# Patient Record
Sex: FEMALE | Race: WHITE | NOT HISPANIC OR LATINO | Employment: PART TIME | URBAN - NONMETROPOLITAN AREA
[De-identification: names, ages, dates, MRNs, and addresses within clinical notes are randomized per-mention and may not be internally consistent; named-entity substitution may affect disease eponyms.]

---

## 2020-11-16 ENCOUNTER — OFFICE VISIT (OUTPATIENT)
Dept: CARDIOLOGY | Facility: CLINIC | Age: 65
End: 2020-11-16

## 2020-11-16 VITALS
OXYGEN SATURATION: 98 % | HEART RATE: 78 BPM | HEIGHT: 62 IN | DIASTOLIC BLOOD PRESSURE: 78 MMHG | SYSTOLIC BLOOD PRESSURE: 160 MMHG | BODY MASS INDEX: 35.99 KG/M2 | WEIGHT: 195.6 LBS | TEMPERATURE: 97.5 F

## 2020-11-16 DIAGNOSIS — R06.83 SNORING: ICD-10-CM

## 2020-11-16 DIAGNOSIS — R06.02 SHORTNESS OF BREATH: ICD-10-CM

## 2020-11-16 DIAGNOSIS — R00.2 PALPITATIONS: ICD-10-CM

## 2020-11-16 DIAGNOSIS — I10 ESSENTIAL HYPERTENSION: Primary | ICD-10-CM

## 2020-11-16 PROCEDURE — 99204 OFFICE O/P NEW MOD 45 MIN: CPT | Performed by: PHYSICIAN ASSISTANT

## 2020-11-16 RX ORDER — ESTRADIOL 0.1 MG/G
2 CREAM VAGINAL DAILY
COMMUNITY

## 2020-11-16 RX ORDER — TIMOLOL MALEATE 5 MG/ML
SOLUTION OPHTHALMIC
COMMUNITY
Start: 2020-08-25

## 2020-11-16 RX ORDER — OMEPRAZOLE 20 MG/1
20 CAPSULE, DELAYED RELEASE ORAL DAILY
COMMUNITY

## 2020-11-16 RX ORDER — CHLORAL HYDRATE 500 MG
CAPSULE ORAL
COMMUNITY
End: 2022-08-01

## 2020-11-16 RX ORDER — HYDROCHLOROTHIAZIDE 12.5 MG/1
12.5 CAPSULE, GELATIN COATED ORAL DAILY
COMMUNITY
Start: 2020-09-15

## 2020-11-16 RX ORDER — CLONIDINE HYDROCHLORIDE 0.1 MG/1
TABLET ORAL
COMMUNITY
Start: 2020-11-04

## 2020-11-16 RX ORDER — MELOXICAM 15 MG/1
TABLET ORAL DAILY
COMMUNITY
Start: 2020-10-09

## 2020-11-16 RX ORDER — BRIMONIDINE TARTRATE 0.1 %
DROPS OPHTHALMIC (EYE)
COMMUNITY
Start: 2020-08-25 | End: 2022-08-01

## 2020-11-16 RX ORDER — BENAZEPRIL HYDROCHLORIDE 20 MG/1
TABLET ORAL DAILY
COMMUNITY
Start: 2020-09-15

## 2020-11-16 RX ORDER — VITAMIN E 268 MG
400 CAPSULE ORAL DAILY
COMMUNITY
End: 2022-08-01

## 2020-11-16 RX ORDER — DIPHENHYDRAMINE HCL 25 MG
25 CAPSULE ORAL NIGHTLY
COMMUNITY
End: 2022-08-01

## 2020-11-16 RX ORDER — AMLODIPINE BESYLATE 5 MG/1
TABLET ORAL DAILY
COMMUNITY
Start: 2020-11-04

## 2020-11-16 RX ORDER — CETIRIZINE HYDROCHLORIDE 10 MG/1
10 TABLET ORAL DAILY
COMMUNITY

## 2020-11-16 RX ORDER — VENLAFAXINE 75 MG/1
TABLET ORAL
COMMUNITY
Start: 2020-10-26

## 2020-11-16 RX ORDER — ASPIRIN 81 MG/1
TABLET ORAL DAILY
COMMUNITY
Start: 2020-11-04

## 2020-11-16 NOTE — PROGRESS NOTES
Subjective   Vanna Burton is a 65 y.o. female     Chief Complaint   Patient presents with   • Establish Care     presents for cardiac eval   • Hypertension   • Palpitations   • Shortness of Breath       HPI  The patient presents today for initial evaluation.  This pleasant patient is referred because of hypertension, and mild symptoms otherwise.  The patient really has never had any severe significant problems with blood pressure.  Recently, when checking blood pressures, she noted blood pressures even as high as 200/100.  It seems, by her report, that this was a rather sudden onset of hypertension.  She went to see her primary care provider.  Amlodipine was instituted in addition to her benazepril dosing.  She was given clonidine to utilize as needed for hypertensive excursions.  It was felt best to have the patient referred on for cardiac evaluation and she presents today in that setting.    Symptomatically, the patient is doing fairly well.  She has dyspnea at times.  She has only rare episodes of palpitations, certainly nothing to suggest sustained dysrhythmic activity.  The patient reports no dizziness or syncope.  Exercise capacity is adequate and at baseline.  She really has no limitation from cardiovascular standpoint at this time.  The patient has no failure symptoms.  She denies chest pain or anginal equivalent complications otherwise.  She has no further complaints.      Current Outpatient Medications   Medication Sig Dispense Refill   • Advair Diskus 250-50 MCG/DOSE DISKUS INHALE 1  PUFF PO BID FOR ASTHMA     • Alphagan P 0.1 % solution ophthalmic solution      • amLODIPine (NORVASC) 5 MG tablet Take  by mouth Daily.     • Aspirin Adult Low Strength 81 MG EC tablet Take  by mouth Daily.     • benazepril (LOTENSIN) 20 MG tablet Take  by mouth Daily.     • cetirizine (zyrTEC) 10 MG tablet Take 10 mg by mouth Daily.     • cloNIDine (CATAPRES) 0.1 MG tablet TK 1 T PO UP TO 3 TIMES DAILY IF BLOOD PRESSURE IS  GREATER THAN  160/95     • diphenhydrAMINE (BENADRYL) 25 mg capsule Take 25 mg by mouth Every Night.     • estradiol (ESTRACE) 0.1 MG/GM vaginal cream Insert 2 g into the vagina Daily.     • hydroCHLOROthiazide (MICROZIDE) 12.5 MG capsule Take 12.5 mg by mouth Daily.     • meloxicam (MOBIC) 15 MG tablet Take  by mouth Daily.     • Omega-3 Fatty Acids (fish oil) 1000 MG capsule capsule Take  by mouth Daily With Breakfast.     • omeprazole (priLOSEC) 20 MG capsule Take 20 mg by mouth Daily.     • timolol (TIMOPTIC-XR) 0.5 % ophthalmic gel-forming      • venlafaxine (EFFEXOR) 75 MG tablet      • vitamin E 400 UNIT capsule Take 400 Units by mouth Daily.       No current facility-administered medications for this visit.        Sulfa antibiotics    Past Medical History:   Diagnosis Date   • Asthma    • Hypertension        Social History     Socioeconomic History   • Marital status:      Spouse name: Not on file   • Number of children: Not on file   • Years of education: Not on file   • Highest education level: Not on file   Tobacco Use   • Smoking status: Former Smoker     Years: 20.00     Quit date:      Years since quittin.8   • Smokeless tobacco: Never Used   Substance and Sexual Activity   • Alcohol use: Never     Frequency: Never   • Drug use: Never       Family History   Problem Relation Age of Onset   • Stroke Mother    • Hypertension Mother    • Hypertension Father        Review of Systems   Constitutional: Positive for fatigue. Negative for chills, diaphoresis and fever.   Eyes: Positive for visual disturbance.   Respiratory: Positive for shortness of breath (on exertion). Negative for apnea, cough, chest tightness and wheezing.    Cardiovascular: Positive for palpitations (occas flutter and racing). Negative for chest pain and leg swelling.   Gastrointestinal: Negative.  Negative for abdominal pain, blood in stool, constipation, diarrhea, nausea and vomiting.   Endocrine: Negative.   "  Genitourinary: Negative.  Negative for hematuria.   Musculoskeletal: Positive for arthralgias. Negative for back pain, myalgias and neck pain.   Skin: Negative.    Allergic/Immunologic: Positive for environmental allergies. Negative for food allergies.   Neurological: Positive for headaches. Negative for dizziness, syncope, weakness, light-headedness and numbness.   Hematological: Bruises/bleeds easily.   Psychiatric/Behavioral: Negative.  Negative for agitation and sleep disturbance. The patient is not nervous/anxious.        Objective     Vitals:    11/16/20 1047   BP: 160/78   BP Location: Left arm   Patient Position: Sitting   Pulse: 78   Temp: 97.5 °F (36.4 °C)   SpO2: 98%   Weight: 88.7 kg (195 lb 9.6 oz)   Height: 157.5 cm (62\")        /78 (BP Location: Left arm, Patient Position: Sitting)   Pulse 78   Temp 97.5 °F (36.4 °C)   Ht 157.5 cm (62\")   Wt 88.7 kg (195 lb 9.6 oz)   SpO2 98%   BMI 35.78 kg/m²      Lab Results (most recent)     None          Physical Exam  Vitals signs and nursing note reviewed.   Constitutional:       General: She is not in acute distress.     Appearance: She is well-developed.   HENT:      Head: Normocephalic and atraumatic.   Eyes:      Conjunctiva/sclera: Conjunctivae normal.      Pupils: Pupils are equal, round, and reactive to light.   Neck:      Musculoskeletal: Normal range of motion and neck supple.      Vascular: No JVD.      Trachea: No tracheal deviation.   Cardiovascular:      Rate and Rhythm: Normal rate and regular rhythm.      Heart sounds: Normal heart sounds.   Pulmonary:      Effort: Pulmonary effort is normal.      Breath sounds: Normal breath sounds.   Abdominal:      General: Bowel sounds are normal. There is no distension.      Palpations: Abdomen is soft. There is no mass.      Tenderness: There is no abdominal tenderness. There is no guarding or rebound.   Musculoskeletal: Normal range of motion.         General: No tenderness or deformity. "   Skin:     General: Skin is warm and dry.      Coloration: Skin is not pale.      Findings: No erythema or rash.   Neurological:      Mental Status: She is alert and oriented to person, place, and time.   Psychiatric:         Behavior: Behavior normal.         Thought Content: Thought content normal.         Judgment: Judgment normal.         Procedure   Procedures         Assessment/Plan      Diagnosis Plan   1. Essential hypertension  Adult Transthoracic Echo Complete W/ Cont if Necessary Per Protocol    Overnight Sleep Oximetry Study   2. Shortness of breath  Adult Transthoracic Echo Complete W/ Cont if Necessary Per Protocol    Overnight Sleep Oximetry Study   3. Palpitations  Adult Transthoracic Echo Complete W/ Cont if Necessary Per Protocol    Overnight Sleep Oximetry Study   4. Snoring  Adult Transthoracic Echo Complete W/ Cont if Necessary Per Protocol    Overnight Sleep Oximetry Study   1.  The patient is referred in the setting of recurrent hypertensive excursions.  Amlodipine has been started.  She has continued benazepril.  She has clonidine to utilize as needed for hypertensive excursions noted, blood pressure 160/90 or greater.    2.  She has improved but remains hypertensive on amlodipine at 5 mg 1 tab daily.  I have asked her to double up on that to 10 mg total daily.  If that is effective, she will call and I would call her just to 10 mg 1 tab daily dosing to her pharmacy.    3.  The patient will continue antihypertensive therapies otherwise all as outlined above.    4.  I would like to schedule for an echocardiogram so that we can evaluate LV size and function, valvular morphologies, and cardiac structure otherwise.    5.  Also, with significant hypertension, history of snoring, and clinical course/clinical symptoms otherwise, I will schedule for an overnight oximetry.  This will allow us to screen for undiagnosed sleep disorder.    6.  If she remains hypertensive despite increased  antihypertensive therapies, I feel that we will be forced to evaluate for causes of secondary hypertension.    7.  We have discussed lifestyle modifications including sodium restriction, diet, exercise, etc.    8.  I will see her back to review results of all the above in response to increase in medical therapy.  If, within the next week or 2, blood pressures do not improve with intensified amlodipine therapy, she will call and we will adjust further at that time.             Vanna Josephine  reports that she quit smoking about 10 months ago. She quit after 20.00 years of use. She has never used smokeless tobacco..         Patient's Body mass index is 35.78 kg/m². BMI is above normal parameters. Recommendations include: educational material.           Electronically signed by:

## 2020-11-16 NOTE — PATIENT INSTRUCTIONS

## 2020-11-25 ENCOUNTER — TELEPHONE (OUTPATIENT)
Dept: CARDIOLOGY | Facility: CLINIC | Age: 65
End: 2020-11-25

## 2020-11-25 DIAGNOSIS — R06.83 SNORING: ICD-10-CM

## 2020-11-25 DIAGNOSIS — R79.81 ABNORMAL PULSE OXIMETRY: Primary | ICD-10-CM

## 2020-11-25 DIAGNOSIS — R06.02 SHORTNESS OF BREATH: ICD-10-CM

## 2020-11-25 NOTE — TELEPHONE ENCOUNTER
Left message for patient to return call for test results. [facility] SHEETS    ----- Message from JOSE CARLOS Vargas sent at 11/25/2020  4:24 PM EST -----    Overnight oximetry abnormal.  Please refer to pulmonology.  Please let patient know today if possible.

## 2020-11-30 NOTE — TELEPHONE ENCOUNTER
Patient aware of results. She has no preference but request to stay in [place] if possible. Advised she will receive a call once scheduled.  Gretchen Colin MA      Left message for patient to return call for test results. MS,BLANCA     ----- Message from JOSE CARLOS Vargas sent at 11/25/2020  4:24 PM EST -----     Overnight oximetry abnormal.  Please refer to pulmonology.  Please let patient know today if possible

## 2020-12-16 ENCOUNTER — HOSPITAL ENCOUNTER (OUTPATIENT)
Dept: CARDIOLOGY | Facility: HOSPITAL | Age: 65
Discharge: HOME | End: 2020-12-16
Admitting: PHYSICIAN ASSISTANT

## 2020-12-16 VITALS — BODY MASS INDEX: 35.99 KG/M2 | WEIGHT: 195.55 LBS | HEIGHT: 62 IN

## 2020-12-16 DIAGNOSIS — R00.2 PALPITATIONS: ICD-10-CM

## 2020-12-16 DIAGNOSIS — R06.02 SHORTNESS OF BREATH: ICD-10-CM

## 2020-12-16 DIAGNOSIS — R06.83 SNORING: ICD-10-CM

## 2020-12-16 DIAGNOSIS — I10 ESSENTIAL HYPERTENSION: ICD-10-CM

## 2020-12-16 PROCEDURE — 93306 TTE W/DOPPLER COMPLETE: CPT

## 2020-12-16 PROCEDURE — 93356 MYOCRD STRAIN IMG SPCKL TRCK: CPT

## 2020-12-16 PROCEDURE — 93306 TTE W/DOPPLER COMPLETE: CPT | Performed by: INTERNAL MEDICINE

## 2020-12-16 PROCEDURE — 93356 MYOCRD STRAIN IMG SPCKL TRCK: CPT | Performed by: INTERNAL MEDICINE

## 2020-12-20 LAB
AORTIC DIMENSIONLESS INDEX: 0.9 (DI)
BH CV ECHO MEAS - ACS: 1.6 CM
BH CV ECHO MEAS - AO MAX PG (FULL): 0.99 MMHG
BH CV ECHO MEAS - AO MAX PG: 5.6 MMHG
BH CV ECHO MEAS - AO MEAN PG (FULL): 1 MMHG
BH CV ECHO MEAS - AO MEAN PG: 3 MMHG
BH CV ECHO MEAS - AO ROOT AREA (BSA CORRECTED): 1.5
BH CV ECHO MEAS - AO ROOT AREA: 6.6 CM^2
BH CV ECHO MEAS - AO ROOT DIAM: 2.9 CM
BH CV ECHO MEAS - AO V2 MAX: 118 CM/SEC
BH CV ECHO MEAS - AO V2 MEAN: 81.3 CM/SEC
BH CV ECHO MEAS - AO V2 VTI: 29.8 CM
BH CV ECHO MEAS - BSA(HAYCOCK): 2 M^2
BH CV ECHO MEAS - BSA: 1.9 M^2
BH CV ECHO MEAS - BZI_BMI: 35.7 KILOGRAMS/M^2
BH CV ECHO MEAS - BZI_METRIC_HEIGHT: 157.5 CM
BH CV ECHO MEAS - BZI_METRIC_WEIGHT: 88.5 KG
BH CV ECHO MEAS - EDV(CUBED): 74.1 ML
BH CV ECHO MEAS - EDV(TEICH): 78.6 ML
BH CV ECHO MEAS - EF(CUBED): 63.9 %
BH CV ECHO MEAS - EF(TEICH): 55.8 %
BH CV ECHO MEAS - EF_3D-VOL: 63 %
BH CV ECHO MEAS - ESV(CUBED): 26.7 ML
BH CV ECHO MEAS - ESV(TEICH): 34.7 ML
BH CV ECHO MEAS - FS: 28.8 %
BH CV ECHO MEAS - IVS/LVPW: 1.3
BH CV ECHO MEAS - IVSD: 1.3 CM
BH CV ECHO MEAS - LA DIMENSION: 3.7 CM
BH CV ECHO MEAS - LA/AO: 1.3
BH CV ECHO MEAS - LAT PEAK E' VEL: 9.2 CM/SEC
BH CV ECHO MEAS - LV IVRT: 0.11 SEC
BH CV ECHO MEAS - LV MASS(C)D: 161 GRAMS
BH CV ECHO MEAS - LV MASS(C)DI: 85.1 GRAMS/M^2
BH CV ECHO MEAS - LV MAX PG: 4.6 MMHG
BH CV ECHO MEAS - LV MEAN PG: 2 MMHG
BH CV ECHO MEAS - LV V1 MAX: 107 CM/SEC
BH CV ECHO MEAS - LV V1 MEAN: 54.7 CM/SEC
BH CV ECHO MEAS - LV V1 VTI: 22.7 CM
BH CV ECHO MEAS - LVIDD: 4.2 CM
BH CV ECHO MEAS - LVIDS: 3 CM
BH CV ECHO MEAS - LVPWD: 0.98 CM
BH CV ECHO MEAS - MED PEAK E' VEL: 7.1 CM/SEC
BH CV ECHO MEAS - MV A MAX VEL: 63.1 CM/SEC
BH CV ECHO MEAS - MV DEC SLOPE: 516 CM/SEC^2
BH CV ECHO MEAS - MV DEC TIME: 0.19 SEC
BH CV ECHO MEAS - MV E MAX VEL: 89.3 CM/SEC
BH CV ECHO MEAS - MV E/A: 1.4
BH CV ECHO MEAS - MV MAX PG: 4 MMHG
BH CV ECHO MEAS - MV MEAN PG: 2 MMHG
BH CV ECHO MEAS - MV P1/2T MAX VEL: 98.5 CM/SEC
BH CV ECHO MEAS - MV P1/2T: 55.9 MSEC
BH CV ECHO MEAS - MV V2 MAX: 100 CM/SEC
BH CV ECHO MEAS - MV V2 MEAN: 60 CM/SEC
BH CV ECHO MEAS - MV V2 VTI: 26.2 CM
BH CV ECHO MEAS - MVA P1/2T LCG: 2.2 CM^2
BH CV ECHO MEAS - MVA(P1/2T): 3.9 CM^2
BH CV ECHO MEAS - PA MAX PG (FULL): 1.6 MMHG
BH CV ECHO MEAS - PA MAX PG: 4 MMHG
BH CV ECHO MEAS - PA MEAN PG (FULL): 1 MMHG
BH CV ECHO MEAS - PA MEAN PG: 2 MMHG
BH CV ECHO MEAS - PA V2 MAX: 100 CM/SEC
BH CV ECHO MEAS - PA V2 MEAN: 65.7 CM/SEC
BH CV ECHO MEAS - PA V2 VTI: 28.9 CM
BH CV ECHO MEAS - RV MAX PG: 2.4 MMHG
BH CV ECHO MEAS - RV MEAN PG: 1 MMHG
BH CV ECHO MEAS - RV V1 MAX: 77 CM/SEC
BH CV ECHO MEAS - RV V1 MEAN: 50.4 CM/SEC
BH CV ECHO MEAS - RV V1 VTI: 19.8 CM
BH CV ECHO MEAS - RVDD: 2.6 CM
BH CV ECHO MEAS - SI(AO): 104.1 ML/M^2
BH CV ECHO MEAS - SI(CUBED): 25 ML/M^2
BH CV ECHO MEAS - SI(TEICH): 23.2 ML/M^2
BH CV ECHO MEAS - SV(AO): 196.8 ML
BH CV ECHO MEAS - SV(CUBED): 47.4 ML
BH CV ECHO MEAS - SV(TEICH): 43.9 ML
BH CV ECHO MEASUREMENTS AVERAGE E/E' RATIO: 10.96
MAXIMAL PREDICTED HEART RATE: 156 BPM
STRESS TARGET HR: 133 BPM

## 2020-12-21 ENCOUNTER — TELEPHONE (OUTPATIENT)
Dept: CARDIOLOGY | Facility: CLINIC | Age: 65
End: 2020-12-21

## 2020-12-21 NOTE — TELEPHONE ENCOUNTER
Result Text     1.  LV size, function, and wall motion are normal.  Visually estimated ejection fraction of 60 to 65% is corroborated by a 3D EF of 63%.  Normal global longitudinal strain of -17.3% supports normal LV mechanical functioning.  There is grade 1A diastolic dysfunction with borderline left atrial enlargement.  Right heart chambers are normal.     2.  The aortic valve is normally configured with no aortic stenosis nor aortic insufficiency.  The mitral valve is minimally sclerotic with no mitral stenosis and trivial MR.  Right-sided heart valves are grossly normal.     3.  No pericardial or great vessel pathology.     4.  Pulmonary artery pressures cannot be calculated.          ----- Message from Gretchen Colin MA sent at 12/21/2020  9:23 AM EST -----    ----- Message -----  From: Levi Alberto PA  Sent: 12/21/2020   8:45 AM EST  To: Gretchen Colin MA    Routine follow-up.

## 2020-12-22 NOTE — TELEPHONE ENCOUNTER
Rachel Delgadilloe Card [facility]   Caller: Unspecified (Today,  2:21 PM)              PT LVM THAT SHE MISSED A CALL FROM OUR OFFICE.      Informed pt of results, she verbalized understanding.

## 2021-07-29 ENCOUNTER — OFFICE VISIT (OUTPATIENT)
Dept: CARDIOLOGY | Facility: CLINIC | Age: 66
End: 2021-07-29

## 2021-07-29 VITALS
HEIGHT: 62 IN | WEIGHT: 198 LBS | SYSTOLIC BLOOD PRESSURE: 126 MMHG | DIASTOLIC BLOOD PRESSURE: 79 MMHG | HEART RATE: 83 BPM | BODY MASS INDEX: 36.44 KG/M2 | OXYGEN SATURATION: 97 %

## 2021-07-29 DIAGNOSIS — R00.2 PALPITATIONS: ICD-10-CM

## 2021-07-29 DIAGNOSIS — R06.02 SHORTNESS OF BREATH: ICD-10-CM

## 2021-07-29 DIAGNOSIS — I10 ESSENTIAL HYPERTENSION: Primary | ICD-10-CM

## 2021-07-29 PROCEDURE — 99213 OFFICE O/P EST LOW 20 MIN: CPT | Performed by: PHYSICIAN ASSISTANT

## 2021-07-29 PROCEDURE — 93000 ELECTROCARDIOGRAM COMPLETE: CPT | Performed by: PHYSICIAN ASSISTANT

## 2021-07-29 NOTE — PROGRESS NOTES
Problem list     Subjective   Vanna Burton is a 66 y.o. female     Chief Complaint   Patient presents with   • Follow-up     testing        HPI  The patient presents back today to review clinical course, response to blood pressure medication, and for discussion of previous test results.  We had seen her because of hypertension at last evaluation.  She was subsequent scheduled for an echocardiogram and an overnight oximeter.  Echo supported preserved systolic function with no significant valvular issues or structural abnormalities noted otherwise.  Overnight oximeter suggested undiagnosed sleep disorder.  She was recommended to have pulmonology/sleep medicine evaluation.  She has not had that and would prefer to hold on that.  She will call should she decide to have that in the future.  Clinically, the patient reports that her blood pressures have now normalized on current medical regimen.  Blood pressures at home mirror that as recorded below.  She denies chest pain.  She has stable dyspnea.  She has no failure or dysrhythmic symptoms.  The patient has no further complaints.    Current Outpatient Medications on File Prior to Visit   Medication Sig Dispense Refill   • Advair Diskus 250-50 MCG/DOSE DISKUS INHALE 1  PUFF PO BID FOR ASTHMA     • Alphagan P 0.1 % solution ophthalmic solution      • amLODIPine (NORVASC) 5 MG tablet Take  by mouth Daily.     • Aspirin Adult Low Strength 81 MG EC tablet Take  by mouth Daily.     • benazepril (LOTENSIN) 20 MG tablet Take  by mouth Daily.     • cetirizine (zyrTEC) 10 MG tablet Take 10 mg by mouth Daily.     • cloNIDine (CATAPRES) 0.1 MG tablet TK 1 T PO UP TO 3 TIMES DAILY IF BLOOD PRESSURE IS GREATER THAN  160/95     • diphenhydrAMINE (BENADRYL) 25 mg capsule Take 25 mg by mouth Every Night.     • estradiol (ESTRACE) 0.1 MG/GM vaginal cream Insert 2 g into the vagina Daily.     • hydroCHLOROthiazide (MICROZIDE) 12.5 MG capsule Take 12.5 mg by mouth Daily.     • meloxicam  (MOBIC) 15 MG tablet Take  by mouth Daily.     • timolol (TIMOPTIC-XR) 0.5 % ophthalmic gel-forming      • venlafaxine (EFFEXOR) 75 MG tablet      • Omega-3 Fatty Acids (fish oil) 1000 MG capsule capsule Take  by mouth Daily With Breakfast.     • omeprazole (priLOSEC) 20 MG capsule Take 20 mg by mouth Daily.     • vitamin E 400 UNIT capsule Take 400 Units by mouth Daily.       No current facility-administered medications on file prior to visit.       Sulfa antibiotics    Past Medical History:   Diagnosis Date   • Asthma    • Hypertension        Social History     Socioeconomic History   • Marital status:      Spouse name: Not on file   • Number of children: Not on file   • Years of education: Not on file   • Highest education level: Not on file   Tobacco Use   • Smoking status: Former Smoker     Years: .     Quit date:      Years since quittin.5   • Smokeless tobacco: Never Used   Substance and Sexual Activity   • Alcohol use: Never   • Drug use: Never       Family History   Problem Relation Age of Onset   • Stroke Mother    • Hypertension Mother    • Hypertension Father        Review of Systems   Constitutional: Positive for fatigue. Negative for chills and fever.   HENT: Negative.  Negative for congestion, rhinorrhea and sore throat.    Eyes: Negative.  Negative for visual disturbance.   Respiratory: Negative.  Negative for chest tightness, shortness of breath and wheezing.    Cardiovascular: Negative.  Negative for chest pain, palpitations and leg swelling.   Gastrointestinal: Negative.    Endocrine: Negative.    Genitourinary: Negative.    Musculoskeletal: Positive for arthralgias and back pain. Negative for neck pain.   Skin: Negative.  Negative for rash and wound.   Allergic/Immunologic: Positive for environmental allergies.   Neurological: Negative for dizziness, weakness, numbness and headaches.   Hematological: Bruises/bleeds easily (bruises/bleeds).   Psychiatric/Behavioral: Negative.   "Negative for sleep disturbance.       Objective   Vitals:    07/29/21 1625   BP: 126/79   BP Location: Left arm   Patient Position: Sitting   Pulse: 83   SpO2: 97%   Weight: 89.8 kg (198 lb)   Height: 157.5 cm (62\")      /79 (BP Location: Left arm, Patient Position: Sitting)   Pulse 83   Ht 157.5 cm (62\")   Wt 89.8 kg (198 lb)   SpO2 97%   BMI 36.21 kg/m²    Lab Results (most recent)     None        Physical Exam  Vitals and nursing note reviewed.   Constitutional:       General: She is not in acute distress.     Appearance: She is well-developed.   HENT:      Head: Normocephalic and atraumatic.   Eyes:      Conjunctiva/sclera: Conjunctivae normal.      Pupils: Pupils are equal, round, and reactive to light.   Neck:      Vascular: No JVD.      Trachea: No tracheal deviation.   Cardiovascular:      Rate and Rhythm: Normal rate and regular rhythm.      Heart sounds: Normal heart sounds.   Pulmonary:      Effort: Pulmonary effort is normal.      Breath sounds: Normal breath sounds.   Abdominal:      General: Bowel sounds are normal. There is no distension.      Palpations: Abdomen is soft. There is no mass.      Tenderness: There is no abdominal tenderness. There is no guarding or rebound.   Musculoskeletal:         General: No tenderness or deformity. Normal range of motion.      Cervical back: Normal range of motion and neck supple.   Skin:     General: Skin is warm and dry.      Coloration: Skin is not pale.      Findings: No erythema or rash.   Neurological:      Mental Status: She is alert and oriented to person, place, and time.   Psychiatric:         Behavior: Behavior normal.         Thought Content: Thought content normal.         Judgment: Judgment normal.           Procedure     ECG 12 Lead    Date/Time: 7/29/2021 4:28 PM  Performed by: Levi Alberto PA  Authorized by: Levi Alberto PA   Comparison: compared with previous ECG from 11/4/2020  Comparison to previous ECG: Sinus rhythm, rate " 72, normal axis, no acute changes noted.                 Assessment/Plan      Diagnosis Plan   1. Essential hypertension     2. Shortness of breath     3. Palpitations       1.  At this time, the patient appears to be doing fairly well from general cardiovascular standpoint.  Her dyspnea is stable.  Her palpitations have largely resolved.  She denies angina, failure, or dysrhythmic symptoms otherwise.    2.  On current medical regimen, she reports that her blood pressures have now normalized.  We will continue antihypertensive regimen without change.  She will monitor blood pressures and call to us for any issues.    3.  Her echocardiogram findings indicate preserved systolic function with no significant abnormalities.  Overnight oximeter findings were abnormal suggesting undiagnosed sleep disorder.  She does not want to pursue evaluation for sleep apnea/sleep study at this time.  Should she change her mind on that, she will call.    4.  We will make no adjustments in medications.  As she is doing well, nothing further and we will see her on a yearly evaluation.              Electronically signed by:

## 2021-07-29 NOTE — PATIENT INSTRUCTIONS

## 2022-08-01 ENCOUNTER — OFFICE VISIT (OUTPATIENT)
Dept: CARDIOLOGY | Facility: CLINIC | Age: 67
End: 2022-08-01

## 2022-08-01 VITALS
HEIGHT: 62 IN | HEART RATE: 82 BPM | WEIGHT: 198 LBS | DIASTOLIC BLOOD PRESSURE: 86 MMHG | BODY MASS INDEX: 36.44 KG/M2 | OXYGEN SATURATION: 96 % | SYSTOLIC BLOOD PRESSURE: 151 MMHG

## 2022-08-01 DIAGNOSIS — R00.2 PALPITATIONS: ICD-10-CM

## 2022-08-01 DIAGNOSIS — R01.1 MURMUR: ICD-10-CM

## 2022-08-01 DIAGNOSIS — R06.02 SHORTNESS OF BREATH: Primary | ICD-10-CM

## 2022-08-01 PROCEDURE — 99213 OFFICE O/P EST LOW 20 MIN: CPT | Performed by: PHYSICIAN ASSISTANT

## 2022-08-01 PROCEDURE — 93000 ELECTROCARDIOGRAM COMPLETE: CPT | Performed by: PHYSICIAN ASSISTANT

## 2022-08-01 RX ORDER — ALBUTEROL SULFATE 90 UG/1
2 AEROSOL, METERED RESPIRATORY (INHALATION) EVERY 4 HOURS PRN
COMMUNITY

## 2022-08-01 RX ORDER — MULTIPLE VITAMINS W/ MINERALS TAB 9MG-400MCG
1 TAB ORAL DAILY
COMMUNITY

## 2022-08-01 RX ORDER — VIT C/B6/B5/MAGNESIUM/HERB 173 50-5-6-5MG
CAPSULE ORAL
COMMUNITY

## 2022-08-01 NOTE — PROGRESS NOTES
Problem list     Subjective   Vanna Burton is a 67 y.o. female     Chief Complaint   Patient presents with   • Follow-up     1 year    • Hypertension       HPI  The patient presents in the clinic today for yearly follow-up.  We had seen this very pleasant patient initially for symptoms and hypertension.  She had a subsequent echo and follow-up which indicated preserved systolic function with no significant valvular or structural abnormalities noted.  Overnight oximeter supported undiagnosed sleep disorder.  We recommended to sleep medicine evaluation and the patient has opted to hold on that for now.  Blood pressures mostly have improved on current antihypertensive regimen.  Otherwise, she denies chest pain.  She has stable dyspnea.  Her main concern at this time is with palpitations.  She reports irregularities in heart rhythm which she feels could be related to stress.  She has no associated dizziness or syncope at that time.  She was also told recently through routine medical exam that she had a murmur.  This has not been noted in the past.  This was noted by exam again today.    Current Outpatient Medications on File Prior to Visit   Medication Sig Dispense Refill   • albuterol sulfate  (90 Base) MCG/ACT inhaler Inhale 2 puffs Every 4 (Four) Hours As Needed for Wheezing.     • amLODIPine (NORVASC) 5 MG tablet Take  by mouth Daily.     • Aspirin Adult Low Strength 81 MG EC tablet Take  by mouth Daily.     • benazepril (LOTENSIN) 20 MG tablet Take  by mouth Daily.     • cetirizine (zyrTEC) 10 MG tablet Take 10 mg by mouth Daily.     • cloNIDine (CATAPRES) 0.1 MG tablet TK 1 T PO UP TO 3 TIMES DAILY IF BLOOD PRESSURE IS GREATER THAN  160/95     • estradiol (ESTRACE) 0.1 MG/GM vaginal cream Insert 2 g into the vagina Daily.     • hydroCHLOROthiazide (MICROZIDE) 12.5 MG capsule Take 12.5 mg by mouth Daily.     • meloxicam (MOBIC) 15 MG tablet Take  by mouth Daily.     • multivitamin with minerals  (MULTIVITAMIN ADULT PO) Take 1 tablet by mouth Daily.     • omeprazole (priLOSEC) 20 MG capsule Take 20 mg by mouth Daily.     • timolol (TIMOPTIC-XR) 0.5 % ophthalmic gel-forming      • Turmeric 500 MG capsule Take  by mouth.     • venlafaxine (EFFEXOR) 75 MG tablet      • [DISCONTINUED] Advair Diskus 250-50 MCG/DOSE DISKUS INHALE 1  PUFF PO BID FOR ASTHMA     • [DISCONTINUED] Alphagan P 0.1 % solution ophthalmic solution      • [DISCONTINUED] diphenhydrAMINE (BENADRYL) 25 mg capsule Take 25 mg by mouth Every Night.     • [DISCONTINUED] Omega-3 Fatty Acids (fish oil) 1000 MG capsule capsule Take  by mouth Daily With Breakfast.     • [DISCONTINUED] vitamin E 400 UNIT capsule Take 400 Units by mouth Daily.       No current facility-administered medications on file prior to visit.       Sulfa antibiotics    Past Medical History:   Diagnosis Date   • Asthma    • Hypertension        Social History     Socioeconomic History   • Marital status:    Tobacco Use   • Smoking status: Former Smoker     Years: 20.00     Quit date:      Years since quittin.5   • Smokeless tobacco: Never Used   Substance and Sexual Activity   • Alcohol use: Never   • Drug use: Never       Family History   Problem Relation Age of Onset   • Stroke Mother    • Hypertension Mother    • Hypertension Father        Review of Systems   Constitutional: Positive for fatigue. Negative for chills and fever.   HENT: Positive for congestion and rhinorrhea. Negative for sore throat.    Eyes: Positive for visual disturbance (reading glasses).   Respiratory: Positive for shortness of breath. Negative for chest tightness and wheezing.    Cardiovascular: Negative.  Negative for chest pain, palpitations and leg swelling.   Gastrointestinal: Negative.    Endocrine: Negative.    Genitourinary: Negative.    Musculoskeletal: Negative.  Negative for arthralgias, back pain and neck pain.   Skin: Negative.  Negative for rash and wound.  "  Allergic/Immunologic: Positive for environmental allergies.   Neurological: Negative.  Negative for dizziness, weakness, numbness and headaches.   Hematological: Negative.  Does not bruise/bleed easily.   Psychiatric/Behavioral: Negative.  Negative for sleep disturbance.       Objective   Vitals:    08/01/22 1014   BP: 151/86   BP Location: Left arm   Patient Position: Sitting   Pulse: 82   SpO2: 96%   Weight: 89.8 kg (198 lb)   Height: 157.5 cm (62\")      /86 (BP Location: Left arm, Patient Position: Sitting)   Pulse 82   Ht 157.5 cm (62\")   Wt 89.8 kg (198 lb)   SpO2 96%   BMI 36.21 kg/m²    Lab Results (most recent)     None        Physical Exam  Vitals and nursing note reviewed.   Constitutional:       General: She is not in acute distress.     Appearance: She is well-developed.   HENT:      Head: Normocephalic and atraumatic.   Eyes:      Conjunctiva/sclera: Conjunctivae normal.      Pupils: Pupils are equal, round, and reactive to light.   Neck:      Vascular: No JVD.      Trachea: No tracheal deviation.   Cardiovascular:      Rate and Rhythm: Normal rate and regular rhythm.      Heart sounds: Normal heart sounds.      Comments: Soft systolic murmur noted at upper left sternal border.  Pulmonary:      Effort: Pulmonary effort is normal.      Breath sounds: Normal breath sounds.   Abdominal:      General: Bowel sounds are normal. There is no distension.      Palpations: Abdomen is soft. There is no mass.      Tenderness: There is no abdominal tenderness. There is no guarding or rebound.   Musculoskeletal:         General: No tenderness or deformity. Normal range of motion.      Cervical back: Normal range of motion and neck supple.   Skin:     General: Skin is warm and dry.      Coloration: Skin is not pale.      Findings: No erythema or rash.   Neurological:      Mental Status: She is alert and oriented to person, place, and time.   Psychiatric:         Behavior: Behavior normal.         Thought " Content: Thought content normal.         Judgment: Judgment normal.           Procedure     ECG 12 Lead    Date/Time: 8/1/2022 10:15 AM  Performed by: Levi Alberto PA  Authorized by: Levi Alberto PA   Comparison: compared with previous ECG from 7/29/2021  Comparison to previous ECG: Sinus rhythm, rate 72, normal axis, minor nonspecific ST and T wave changes, no acute changes noted.                 Assessment & Plan      Diagnosis Plan   1. Shortness of breath  Adult Transthoracic Echo Complete W/ Cont if Necessary Per Protocol    Holter Monitor - 24 Hour   2. Murmur  Adult Transthoracic Echo Complete W/ Cont if Necessary Per Protocol    Holter Monitor - 24 Hour   3. Palpitations  Adult Transthoracic Echo Complete W/ Cont if Necessary Per Protocol    Holter Monitor - 24 Hour     1.  I would like to schedule the patient for Holter monitor.  She does report increasing palpitations and episodes of tachycardia otherwise.  She feels that this is related to stress.  I still would like to evaluate a Holter to ensure no significant dysrhythmic activity.    2.  We will schedule for an echocardiogram as well.  She was told recently through her routine exam that she had a murmur which has not been noted in the past.  I do detect a soft systolic murmur at the upper left sternal by exam today.  We will schedule for an echo to evaluate that further.  We can evaluate cardiac structure otherwise as well.    3.  Hypertension has been an issue in the past but she tells me mostly she has been normotensive as of recent.  I will continue medical regimen/antihypertensive regimen without change.    4.  The patient will call to me for any ongoing issues.  If echo and Holter findings are stable, nothing further and we would continue follow-up on yearly evaluations.                  Advance Care Planning   ACP discussion was declined by the patient. Patient does not have an advance directive, declines further assistance.          Electronically signed by:

## 2022-08-03 ENCOUNTER — TELEPHONE (OUTPATIENT)
Dept: CARDIOLOGY | Facility: CLINIC | Age: 67
End: 2022-08-03

## 2022-08-31 ENCOUNTER — TELEPHONE (OUTPATIENT)
Dept: CARDIOLOGY | Facility: CLINIC | Age: 67
End: 2022-08-31

## 2022-08-31 NOTE — TELEPHONE ENCOUNTER
Patient aware and will continue with other testing. Gretchen Colin MA    ----- Message -----  From: Levi Alberto PA  Sent: 8/29/2022   8:46 AM EDT  To: Gretchen Colin MA    Short episodes of SVT noted.  Routine follow-up otherwise.        Result Text  1.  Sinus rhythm is a baseline and predominant rhythm throughout the study.  2.  Occasional PACs and rare PVCs are noted.  3.  Rare atrial couplets noted.  4.  Short-lived episodes of SVT are noted.  She demonstrates a 5 beat run of SVT at 110 beats a minute.  She also has a short-lived episode at 3 beats of SVT at 156 beats a minute.  5.  Symptoms mostly occur during sinus mechanism.  Rates between 89 and 99 bpm.

## 2022-09-02 NOTE — TELEPHONE ENCOUNTER
A user error has taken place: encounter opened in error, closed for administrative reasons, orders placed in error, not carried out on this patient, medication ordered in error, not dispensed to this patient, charting done on wrong patient and has been corrected.

## 2022-09-08 ENCOUNTER — HOSPITAL ENCOUNTER (OUTPATIENT)
Dept: CARDIOLOGY | Facility: HOSPITAL | Age: 67
Discharge: HOME | End: 2022-09-08
Admitting: PHYSICIAN ASSISTANT

## 2022-09-08 DIAGNOSIS — R06.02 SHORTNESS OF BREATH: ICD-10-CM

## 2022-09-08 DIAGNOSIS — R01.1 MURMUR: ICD-10-CM

## 2022-09-08 DIAGNOSIS — R00.2 PALPITATIONS: ICD-10-CM

## 2022-09-08 PROCEDURE — 93306 TTE W/DOPPLER COMPLETE: CPT | Performed by: SPECIALIST

## 2022-09-08 PROCEDURE — 93306 TTE W/DOPPLER COMPLETE: CPT

## 2022-09-30 LAB
BH CV ECHO MEAS - ACS: 1.72 CM
BH CV ECHO MEAS - AO MAX PG: 9.1 MMHG
BH CV ECHO MEAS - AO MEAN PG: 4.7 MMHG
BH CV ECHO MEAS - AO ROOT DIAM: 2.6 CM
BH CV ECHO MEAS - AO V2 MAX: 150.7 CM/SEC
BH CV ECHO MEAS - AO V2 VTI: 33.2 CM
BH CV ECHO MEAS - EDV(CUBED): 59.3 ML
BH CV ECHO MEAS - EDV(MOD-SP4): 69.5 ML
BH CV ECHO MEAS - EF(MOD-SP4): 70.5 %
BH CV ECHO MEAS - EF_3D-VOL: 73 %
BH CV ECHO MEAS - ESV(CUBED): 13.7 ML
BH CV ECHO MEAS - ESV(MOD-SP4): 20.5 ML
BH CV ECHO MEAS - FS: 38.7 %
BH CV ECHO MEAS - IVS/LVPW: 0.98 CM
BH CV ECHO MEAS - IVSD: 1.19 CM
BH CV ECHO MEAS - LA DIMENSION: 3.5 CM
BH CV ECHO MEAS - LAT PEAK E' VEL: 10.1 CM/SEC
BH CV ECHO MEAS - LV DIASTOLIC VOL/BSA (35-75): 36.5 CM2
BH CV ECHO MEAS - LV MASS(C)D: 159.4 GRAMS
BH CV ECHO MEAS - LV SYSTOLIC VOL/BSA (12-30): 10.8 CM2
BH CV ECHO MEAS - LVIDD: 3.9 CM
BH CV ECHO MEAS - LVIDS: 2.39 CM
BH CV ECHO MEAS - LVOT AREA: 3.1 CM2
BH CV ECHO MEAS - LVOT DIAM: 1.99 CM
BH CV ECHO MEAS - LVPWD: 1.2 CM
BH CV ECHO MEAS - MED PEAK E' VEL: 8.3 CM/SEC
BH CV ECHO MEAS - MV A MAX VEL: 78.8 CM/SEC
BH CV ECHO MEAS - MV DEC SLOPE: 484.6 CM/SEC2
BH CV ECHO MEAS - MV E MAX VEL: 98.1 CM/SEC
BH CV ECHO MEAS - MV E/A: 1.24
BH CV ECHO MEAS - RVDD: 3.4 CM
BH CV ECHO MEAS - SI(MOD-SP4): 25.7 ML/M2
BH CV ECHO MEAS - SV(MOD-SP4): 49 ML
BH CV ECHO MEASUREMENTS AVERAGE E/E' RATIO: 10.66
LEFT ATRIUM VOLUME INDEX: 21.4 ML/M2
MAXIMAL PREDICTED HEART RATE: 154 BPM
STRESS TARGET HR: 131 BPM

## 2022-10-03 ENCOUNTER — TELEPHONE (OUTPATIENT)
Dept: CARDIOLOGY | Facility: CLINIC | Age: 67
End: 2022-10-03

## 2022-10-03 NOTE — TELEPHONE ENCOUNTER
----- Message from Gretchen Colin MA sent at 10/3/2022  2:23 PM EDT -----    ----- Message -----  From: Levi Alberto PA  Sent: 9/30/2022   4:42 PM EDT  To: Gretchen Colin MA    Routine f/u;.    · Calculated left ventricular 3D EF = 73% Left ventricular ejection fraction appears to be 66 - 70%. Left ventricular systolic function is normal.  · Left ventricular wall thickness is consistent with mild concentric hypertrophy.  · Left ventricular diastolic function is consistent with (grade II w/high LAP) pseudonormalization.  · The right atrial cavity is mildly dilated.     None

## 2022-10-25 ENCOUNTER — TELEPHONE (OUTPATIENT)
Dept: CARDIOLOGY | Facility: CLINIC | Age: 67
End: 2022-10-25

## 2022-10-25 NOTE — TELEPHONE ENCOUNTER
Per Levi Alberto PA-C:   Pt's monitor results showed nothing severe enough to require medication at this time. There are lifestyle modifications that can help: minimize caffeine, try to minimize stress. If she experiences an increase in palps, we can discuss trying medication.       Called pt, informed her of the above, she verbalized understanding. I also made pt aware of her echo results and that her heart pump function was really good. She verbalized understanding and will call if she has additional concerns.

## 2023-03-01 ENCOUNTER — OFFICE VISIT (OUTPATIENT)
Dept: CARDIOLOGY | Facility: CLINIC | Age: 68
End: 2023-03-01
Payer: MEDICARE

## 2023-03-01 VITALS
WEIGHT: 195 LBS | HEART RATE: 81 BPM | DIASTOLIC BLOOD PRESSURE: 94 MMHG | HEIGHT: 62 IN | BODY MASS INDEX: 35.88 KG/M2 | OXYGEN SATURATION: 98 % | SYSTOLIC BLOOD PRESSURE: 155 MMHG

## 2023-03-01 DIAGNOSIS — I10 ESSENTIAL HYPERTENSION: ICD-10-CM

## 2023-03-01 DIAGNOSIS — R06.02 SHORTNESS OF BREATH: Primary | ICD-10-CM

## 2023-03-01 DIAGNOSIS — R00.2 PALPITATIONS: ICD-10-CM

## 2023-03-01 PROCEDURE — 99213 OFFICE O/P EST LOW 20 MIN: CPT | Performed by: PHYSICIAN ASSISTANT

## 2023-03-01 NOTE — PROGRESS NOTES
Problem list     Subjective   Vanna Burton is a 68 y.o. female     Chief Complaint   Patient presents with   • Follow-up     Shortness of breath       HPI    The patient presents back to the clinic today for evaluation and follow-up.  We had initially seen her in the setting of symptoms and for hypertension otherwise.  She was scheduled for an echo at that time which supported preserved systolic function with no significant valvular or structural abnormalities.  The patient had done well.  She was noted to have a murmur by follow-up exam with her primary care provider.  We were asked to see her again.  We did repeat an echo.  Because of intermittent palpitations, a Holter was performed as well.  Echo again supported preserved systolic function with no significant structural nor valvular abnormalities.  Nothing was felt to be causative or abnormal enough to explain murmur.  Monitor indicated sinus rhythm, occasional PACs and rare PVCs, and short-lived episodes of SVT.  No significant or sustained dysrhythmic activity was noted.  The patient now does well.  Palpitations have resolved.  Dyspnea is at baseline.  Blood pressures are elevated today.  She feels that that was likely her normal at home.  She has no further complaints.  Current Outpatient Medications on File Prior to Visit   Medication Sig Dispense Refill   • albuterol sulfate  (90 Base) MCG/ACT inhaler Inhale 2 puffs Every 4 (Four) Hours As Needed for Wheezing.     • amLODIPine (NORVASC) 5 MG tablet Take  by mouth Daily.     • Aspirin Adult Low Strength 81 MG EC tablet Take  by mouth Daily.     • benazepril (LOTENSIN) 20 MG tablet Take  by mouth Daily.     • cetirizine (zyrTEC) 10 MG tablet Take 1 tablet by mouth Daily.     • cloNIDine (CATAPRES) 0.1 MG tablet TK 1 T PO UP TO 3 TIMES DAILY IF BLOOD PRESSURE IS GREATER THAN  160/95     • estradiol (ESTRACE) 0.1 MG/GM vaginal cream Insert 2 g into the vagina Daily.     • hydroCHLOROthiazide  (MICROZIDE) 12.5 MG capsule Take 1 capsule by mouth Daily.     • meloxicam (MOBIC) 15 MG tablet Take  by mouth Daily.     • multivitamin with minerals tablet tablet Take 1 tablet by mouth Daily.     • omeprazole (priLOSEC) 20 MG capsule Take 1 capsule by mouth Daily.     • timolol (TIMOPTIC-XR) 0.5 % ophthalmic gel-forming      • Turmeric 500 MG capsule Take  by mouth.     • venlafaxine (EFFEXOR) 75 MG tablet        No current facility-administered medications on file prior to visit.       Sulfa antibiotics    Past Medical History:   Diagnosis Date   • Asthma    • Hypertension        Social History     Socioeconomic History   • Marital status:    Tobacco Use   • Smoking status: Former     Years: 20.00     Types: Cigarettes     Quit date: 2020     Years since quitting: 3.1   • Smokeless tobacco: Never   Substance and Sexual Activity   • Alcohol use: Never   • Drug use: Never       Family History   Problem Relation Age of Onset   • Stroke Mother    • Hypertension Mother    • Hypertension Father        Review of Systems   Constitutional: Negative.  Negative for activity change, appetite change, chills, fatigue and fever.   HENT: Negative.  Negative for congestion.    Eyes: Negative.  Negative for visual disturbance.   Respiratory: Negative.  Negative for apnea, cough, chest tightness, shortness of breath and wheezing.    Cardiovascular: Negative.  Negative for chest pain, palpitations and leg swelling.   Gastrointestinal: Negative.  Negative for blood in stool.   Endocrine: Negative.  Negative for cold intolerance and heat intolerance.   Genitourinary: Negative for hematuria.   Musculoskeletal: Positive for arthralgias. Negative for back pain, gait problem, joint swelling, neck pain and neck stiffness.   Skin: Negative.  Negative for color change, rash and wound.   Allergic/Immunologic: Negative.  Negative for environmental allergies and food allergies.   Neurological: Positive for dizziness (Vertigo). Negative  "for syncope, weakness, light-headedness, numbness and headaches.   Hematological: Negative.  Does not bruise/bleed easily.   Psychiatric/Behavioral: Negative.  Negative for sleep disturbance.       Objective   Vitals:    03/01/23 1119   BP: 155/94   BP Location: Left arm   Patient Position: Sitting   Cuff Size: Adult   Pulse: 81   SpO2: 98%   Weight: 88.5 kg (195 lb)   Height: 157.5 cm (62\")      /94 (BP Location: Left arm, Patient Position: Sitting, Cuff Size: Adult)   Pulse 81   Ht 157.5 cm (62\")   Wt 88.5 kg (195 lb)   SpO2 98%   BMI 35.67 kg/m²    Lab Results (most recent)     None        Physical Exam  Vitals and nursing note reviewed.   Constitutional:       General: She is not in acute distress.     Appearance: She is well-developed.   HENT:      Head: Normocephalic and atraumatic.   Eyes:      Conjunctiva/sclera: Conjunctivae normal.      Pupils: Pupils are equal, round, and reactive to light.   Neck:      Vascular: No JVD.      Trachea: No tracheal deviation.   Cardiovascular:      Rate and Rhythm: Normal rate and regular rhythm.      Heart sounds: Normal heart sounds.      Comments: 1/6 systolic murmur second right intercostal space to lower left sternal border.  Pulmonary:      Effort: Pulmonary effort is normal.      Breath sounds: Normal breath sounds.   Abdominal:      General: Bowel sounds are normal. There is no distension.      Palpations: Abdomen is soft. There is no mass.      Tenderness: There is no abdominal tenderness. There is no guarding or rebound.   Musculoskeletal:         General: No tenderness or deformity. Normal range of motion.      Cervical back: Normal range of motion and neck supple.   Skin:     General: Skin is warm and dry.      Coloration: Skin is not pale.      Findings: No erythema or rash.   Neurological:      Mental Status: She is alert and oriented to person, place, and time.   Psychiatric:         Behavior: Behavior normal.         Thought Content: Thought " content normal.         Judgment: Judgment normal.           Procedure   Procedures       Assessment & Plan      Diagnosis Plan   1. Shortness of breath        2. Palpitations        3. Essential hypertension          1.  At this time, the patient appears to be doing well.  Dyspnea remains at baseline.  Palpitations have basically resolved.  Recent echo and Holter monitors were reviewed in detail with the patient today.  The studies were very much benign.    2.  Blood pressures are elevated today.  She will start checking blood pressures routinely at home.  She will call for ongoing hypertensive issues.  We have discussed lifestyle modifications to address the same otherwise.    3.  We will continue medical regimen without change.    4.  We will continue to see her on a yearly evaluation at this time.         Patient did not bring med list or medicine bottles to appointment, med list has been reviewed and updated based on patient's knowledge of their meds.     Advance Care Planning   ACP discussion was declined by the patient. Patient does not have an advance directive, declines further assistance.                Electronically signed by:

## 2023-11-30 ENCOUNTER — TELEPHONE (OUTPATIENT)
Dept: CARDIOLOGY | Facility: CLINIC | Age: 68
End: 2023-11-30

## 2023-11-30 NOTE — TELEPHONE ENCOUNTER
Caller: Vanna Burton    Relationship: Self    Best call back number: [phone]     What was the call regarding: PT HAD FOLLOW APPT ON 12/6/23 AND DUE TO UNFORSEEN CIRCUMSTANCES, THEY HAD TO RESCHEDULE. NEXT AVAILABLE WAS  5/1/24. JUST WANTED TO MAKE OFFICE AWARE.

## 2023-12-04 ENCOUNTER — OFFICE VISIT (OUTPATIENT)
Dept: CARDIOLOGY | Facility: CLINIC | Age: 68
End: 2023-12-04
Payer: MEDICARE

## 2023-12-04 VITALS
WEIGHT: 179.6 LBS | DIASTOLIC BLOOD PRESSURE: 82 MMHG | BODY MASS INDEX: 33.05 KG/M2 | OXYGEN SATURATION: 97 % | HEIGHT: 62 IN | HEART RATE: 73 BPM | SYSTOLIC BLOOD PRESSURE: 133 MMHG

## 2023-12-04 DIAGNOSIS — R00.2 PALPITATIONS: ICD-10-CM

## 2023-12-04 DIAGNOSIS — R06.02 SHORTNESS OF BREATH: Primary | ICD-10-CM

## 2023-12-04 DIAGNOSIS — I10 ESSENTIAL HYPERTENSION: ICD-10-CM

## 2023-12-04 PROCEDURE — 1160F RVW MEDS BY RX/DR IN RCRD: CPT | Performed by: PHYSICIAN ASSISTANT

## 2023-12-04 PROCEDURE — 99213 OFFICE O/P EST LOW 20 MIN: CPT | Performed by: PHYSICIAN ASSISTANT

## 2023-12-04 PROCEDURE — 1159F MED LIST DOCD IN RCRD: CPT | Performed by: PHYSICIAN ASSISTANT

## 2023-12-04 PROCEDURE — 93000 ELECTROCARDIOGRAM COMPLETE: CPT | Performed by: PHYSICIAN ASSISTANT

## 2023-12-04 RX ORDER — SEMAGLUTIDE 0.68 MG/ML
0.25 INJECTION, SOLUTION SUBCUTANEOUS WEEKLY
COMMUNITY

## 2023-12-04 NOTE — PROGRESS NOTES
Problem list     Subjective   Vanna Burton is a 68 y.o. female     Chief Complaint   Patient presents with    Follow-up     9 month follow up, denies chest pain, shortness of breath or palpitation's    Fatigue     occasionally       HPI  The patient presents to clinic today for routine follow-up.  We have seen the patient historically because of symptoms and hypertension.  She initially was scheduled for an echocardiogram, which supported preserved systolic function with no significant valvular nor structural abnormalities present.  She was seen by her primary care provider eventually in follow-up and was advised that there was possibility of a slight murmur.  She was referred back for repeat evaluation.  She was scheduled for an echo and a Holter at that time.  Again echo indicated normal systolic function, grade 2 diastolic dysfunction, with no significant valvular nor structural abnormalities.  There is certainly nothing to explain etiology of murmur.  Monitor at that time indicated occasional PACs, rare PVCs, short-lived episodes of SVT, no significant dysrhythmic activity otherwise.  The patient has since done well.  She reports that her palpitations have now resolved.  She has no chest pain.  Dyspnea and fatigue are at baseline.  To her knowledge she typically is normotensive at home.  She has no further complaints and feels that she is doing well.    Current Outpatient Medications on File Prior to Visit   Medication Sig Dispense Refill    albuterol sulfate  (90 Base) MCG/ACT inhaler Inhale 2 puffs Every 4 (Four) Hours As Needed for Wheezing.      amLODIPine (NORVASC) 5 MG tablet Take  by mouth Daily.      benazepril (LOTENSIN) 20 MG tablet Take  by mouth Daily.      cetirizine (zyrTEC) 10 MG tablet Take 1 tablet by mouth Daily.      cloNIDine (CATAPRES) 0.1 MG tablet TK 1 T PO UP TO 3 TIMES DAILY IF BLOOD PRESSURE IS GREATER THAN  160/95      estradiol (ESTRACE) 0.1 MG/GM vaginal cream Insert 2  applicators into the vagina Daily.      hydroCHLOROthiazide (MICROZIDE) 12.5 MG capsule Take 1 capsule by mouth Daily.      meloxicam (MOBIC) 15 MG tablet Take  by mouth Daily.      multivitamin with minerals tablet tablet Take 1 tablet by mouth Daily.      omeprazole (priLOSEC) 20 MG capsule Take 1 capsule by mouth Daily.      Ozempic, 0.25 or 0.5 MG/DOSE, 2 MG/3ML solution pen-injector Inject 0.25 mg under the skin into the appropriate area as directed 1 (One) Time Per Week.      timolol (TIMOPTIC-XR) 0.5 % ophthalmic gel-forming       Turmeric 500 MG capsule Take  by mouth.      venlafaxine (EFFEXOR) 75 MG tablet Take 1 tablet by mouth Daily.      [DISCONTINUED] Aspirin Adult Low Strength 81 MG EC tablet Take  by mouth Daily.       No current facility-administered medications on file prior to visit.       Sulfa antibiotics    Past Medical History:   Diagnosis Date    Asthma     Hypertension        Social History     Socioeconomic History    Marital status:    Tobacco Use    Smoking status: Former     Years: 20     Types: Cigarettes     Quit date: 2020     Years since quitting: 3.9    Smokeless tobacco: Never   Substance and Sexual Activity    Alcohol use: Never    Drug use: Never    Sexual activity: Defer       Family History   Problem Relation Age of Onset    Stroke Mother     Hypertension Mother         Mother    Hypertension Father     Asthma Father        Review of Systems   Constitutional:  Positive for fatigue. Negative for chills, diaphoresis and fever.   HENT: Negative.     Eyes: Negative.  Negative for visual disturbance.   Respiratory: Negative.  Negative for apnea, cough, chest tightness, shortness of breath and wheezing.    Cardiovascular: Negative.  Negative for chest pain, palpitations and leg swelling.   Gastrointestinal: Negative.  Negative for abdominal pain and blood in stool.   Endocrine: Negative.    Genitourinary: Negative.  Negative for hematuria.   Musculoskeletal:  Positive for  "arthralgias (shoulders and hips). Negative for back pain, myalgias, neck pain and neck stiffness.   Skin: Negative.  Negative for rash and wound.   Allergic/Immunologic: Positive for environmental allergies (seasonal). Negative for food allergies.   Neurological:  Positive for dizziness (vertigo). Negative for syncope, weakness, light-headedness, numbness and headaches.   Hematological:  Bruises/bleeds easily (bruises easily).   Psychiatric/Behavioral: Negative.  Negative for sleep disturbance.        Objective   Vitals:    12/04/23 1110   BP: 133/82   Pulse: 73   SpO2: 97%   Weight: 81.5 kg (179 lb 9.6 oz)   Height: 157.5 cm (62\")      /82   Pulse 73   Ht 157.5 cm (62\")   Wt 81.5 kg (179 lb 9.6 oz)   SpO2 97%   BMI 32.85 kg/m²    Lab Results (most recent)       None          Physical Exam  Vitals and nursing note reviewed.   Constitutional:       General: She is not in acute distress.     Appearance: She is well-developed.   HENT:      Head: Normocephalic and atraumatic.   Eyes:      Conjunctiva/sclera: Conjunctivae normal.      Pupils: Pupils are equal, round, and reactive to light.   Neck:      Vascular: No JVD.      Trachea: No tracheal deviation.   Cardiovascular:      Rate and Rhythm: Normal rate and regular rhythm.      Heart sounds: Normal heart sounds.      Comments: Soft, 1/6 systolic murmur mid lower left sternal border.  Pulmonary:      Effort: Pulmonary effort is normal.      Breath sounds: Normal breath sounds.   Abdominal:      General: Bowel sounds are normal. There is no distension.      Palpations: Abdomen is soft. There is no mass.      Tenderness: There is no abdominal tenderness. There is no guarding or rebound.   Musculoskeletal:         General: No tenderness or deformity. Normal range of motion.      Cervical back: Normal range of motion and neck supple.   Skin:     General: Skin is warm and dry.      Coloration: Skin is not pale.      Findings: No erythema or rash.   Neurological: "      Mental Status: She is alert and oriented to person, place, and time.   Psychiatric:         Behavior: Behavior normal.         Thought Content: Thought content normal.         Judgment: Judgment normal.           Procedure     ECG 12 Lead    Date/Time: 12/4/2023 11:15 AM  Performed by: Levi Alberto PA    Authorized by: Levi Alberto PA  Comparison: compared with previous ECG from 8/8/2022  Comparison to previous ECG: Sinus rhythm, rate 60, normal axis, no acute changes noted.             Assessment & Plan      Diagnosis Plan   1. Shortness of breath        2. Palpitations        3. Essential hypertension          1.  At this time, the patient appears to be doing well from general cardiovascular standpoint.  Palpitations have basically resolved.  Dyspnea is at baseline.  She has no further cardiovascular symptoms nor issues.    2.  We did review prior echo and Holter monitor findings.  Echo again supported preserved systolic function, grade 2 diastolic dysfunction, no significant valvular or structural abnormalities otherwise.  Monitor suggested PACs, PVCs, and short-lived episodes of SVT.  She feels that her dysrhythmic symptoms are mostly related to stress and anxiety.  Those symptoms have now resolved.    3.  As the patient is doing well, I do not feel further evaluation is warranted.    4.  She is now mostly normotensive on current medical regimen.  I would make no adjustment to the same.  She will monitor that closely and call for any complications.  As she is doing well, nothing further and we will see her on a yearly evaluation.           Advance Care Planning   ACP discussion was held with the patient during this visit. Patient does not have an advance directive, declines further assistance.     Patient did not bring med list or medicine bottles to appointment, med list has been reviewed and updated based on patient's knowledge of their meds.    Electronically signed by:

## 2024-12-10 ENCOUNTER — OFFICE VISIT (OUTPATIENT)
Dept: CARDIOLOGY | Facility: CLINIC | Age: 69
End: 2024-12-10
Payer: MEDICARE

## 2024-12-10 VITALS
BODY MASS INDEX: 34.34 KG/M2 | WEIGHT: 186.6 LBS | OXYGEN SATURATION: 95 % | SYSTOLIC BLOOD PRESSURE: 130 MMHG | HEART RATE: 64 BPM | HEIGHT: 62 IN | DIASTOLIC BLOOD PRESSURE: 78 MMHG

## 2024-12-10 DIAGNOSIS — I10 ESSENTIAL HYPERTENSION: ICD-10-CM

## 2024-12-10 DIAGNOSIS — R00.2 PALPITATIONS: ICD-10-CM

## 2024-12-10 DIAGNOSIS — R06.02 SHORTNESS OF BREATH: Primary | ICD-10-CM

## 2024-12-10 PROCEDURE — 99213 OFFICE O/P EST LOW 20 MIN: CPT | Performed by: PHYSICIAN ASSISTANT

## 2024-12-10 PROCEDURE — 1160F RVW MEDS BY RX/DR IN RCRD: CPT | Performed by: PHYSICIAN ASSISTANT

## 2024-12-10 PROCEDURE — 1159F MED LIST DOCD IN RCRD: CPT | Performed by: PHYSICIAN ASSISTANT

## 2024-12-10 PROCEDURE — 93000 ELECTROCARDIOGRAM COMPLETE: CPT | Performed by: PHYSICIAN ASSISTANT

## 2024-12-10 RX ORDER — BRIMONIDINE TARTRATE 2 MG/ML
SOLUTION/ DROPS OPHTHALMIC
COMMUNITY
Start: 2024-11-04

## 2024-12-10 RX ORDER — ATORVASTATIN CALCIUM 20 MG/1
20 TABLET, FILM COATED ORAL
COMMUNITY
Start: 2024-11-14

## 2024-12-10 RX ORDER — OMEPRAZOLE 40 MG/1
1 CAPSULE, DELAYED RELEASE ORAL EVERY 12 HOURS SCHEDULED
COMMUNITY
Start: 2024-11-13

## 2024-12-10 RX ORDER — MECLIZINE HYDROCHLORIDE 25 MG/1
25 TABLET ORAL 3 TIMES DAILY PRN
COMMUNITY
Start: 2024-12-08

## 2024-12-10 RX ORDER — FLUTICASONE PROPIONATE AND SALMETEROL 250; 50 UG/1; UG/1
1 POWDER RESPIRATORY (INHALATION) 2 TIMES DAILY
COMMUNITY
Start: 2024-11-21

## 2024-12-10 NOTE — PROGRESS NOTES
Problem list     Subjective   Vanna Burton is a 69 y.o. female     Chief Complaint   Patient presents with    Follow-up     Yearly follow up        HPI  The patient presents to clinic today for routine follow-up. We have seen the patient historically because of symptoms and hypertension. She initially was scheduled for an echocardiogram, which supported preserved systolic function with no significant valvular nor structural abnormalities present. She was seen by her primary care provider eventually in follow-up and was advised that there was possibility of a slight murmur. She was referred back for repeat evaluation. She was scheduled for an echo and a Holter at that time. Again echo indicated normal systolic function, grade 2 diastolic dysfunction, with no significant valvular nor structural abnormalities. There is certainly nothing to explain etiology of murmur. Monitor at that time indicated occasional PACs, rare PVCs, short-lived episodes of SVT, no significant dysrhythmic activity otherwise.    Findings of the above have been reviewed in detail with the patient.  Clinically, the patient really has done well since last evaluation, 1 year ago.  She reports no current chest pain.  She has stable dyspnea.  Palpitations are infrequent.  She is normotensive at the time, and typically normotensive at home.  She did have recent laboratories, performed after institution of atorvastatin.  Total cholesterol decreased from roughly 260 down to 180.  Triglycerides were 113, HDL 61, and LDL 99.  The patient has no further concerns or complaints and feels that mostly she is doing well.    Current Outpatient Medications on File Prior to Visit   Medication Sig Dispense Refill    albuterol sulfate  (90 Base) MCG/ACT inhaler Inhale 2 puffs Every 4 (Four) Hours As Needed for Wheezing.      amLODIPine (NORVASC) 5 MG tablet Take  by mouth Daily.      atorvastatin (LIPITOR) 20 MG tablet Take 1 tablet by mouth every night at  bedtime.      benazepril (LOTENSIN) 20 MG tablet Take  by mouth Daily.      brimonidine (ALPHAGAN) 0.2 % ophthalmic solution INSTILL 1 DROP IN RIGHT EYE TWICE DAILY      cetirizine (zyrTEC) 10 MG tablet Take 1 tablet by mouth Daily.      cloNIDine (CATAPRES) 0.1 MG tablet TK 1 T PO UP TO 3 TIMES DAILY IF BLOOD PRESSURE IS GREATER THAN  160/95      estradiol (ESTRACE) 0.1 MG/GM vaginal cream Insert 2 g into the vagina Daily.      Fluticasone-Salmeterol (ADVAIR/WIXELA) 250-50 MCG/ACT DISKUS Inhale 1 puff 2 (Two) Times a Day.      hydroCHLOROthiazide (MICROZIDE) 12.5 MG capsule Take 1 capsule by mouth Daily.      meclizine (ANTIVERT) 25 MG tablet Take 1 tablet by mouth 3 (Three) Times a Day As Needed.      meloxicam (MOBIC) 15 MG tablet Take  by mouth Daily.      multivitamin with minerals tablet tablet Take 1 tablet by mouth Daily.      omeprazole (priLOSEC) 40 MG capsule Take 1 capsule by mouth Every 12 (Twelve) Hours.      timolol (TIMOPTIC-XR) 0.5 % ophthalmic gel-forming       Turmeric 500 MG capsule Take  by mouth.      venlafaxine (EFFEXOR) 75 MG tablet Take 1 tablet by mouth Daily.      [DISCONTINUED] omeprazole (priLOSEC) 20 MG capsule Take 2 capsules by mouth Daily.      Ozempic, 0.25 or 0.5 MG/DOSE, 2 MG/3ML solution pen-injector Inject 0.25 mg under the skin into the appropriate area as directed 1 (One) Time Per Week. (Patient not taking: Reported on 12/10/2024)       No current facility-administered medications on file prior to visit.       Sulfa antibiotics    Past Medical History:   Diagnosis Date    Asthma     Hypertension        Social History     Socioeconomic History    Marital status:    Tobacco Use    Smoking status: Former     Current packs/day: 0.00     Types: Cigarettes     Start date:      Quit date: 2020     Years since quittin.9    Smokeless tobacco: Never   Substance and Sexual Activity    Alcohol use: Never    Drug use: Never    Sexual activity: Defer       Family History  "  Problem Relation Age of Onset    Stroke Mother     Hypertension Mother         Mother    Hypertension Father     Asthma Father        Review of Systems   Constitutional:  Positive for fatigue. Negative for chills, diaphoresis and fever.   HENT: Negative.     Eyes: Negative.  Negative for visual disturbance.   Respiratory: Negative.  Negative for apnea, cough, chest tightness, shortness of breath and wheezing.    Cardiovascular: Negative.  Negative for chest pain, palpitations and leg swelling.   Gastrointestinal: Negative.  Negative for abdominal pain and blood in stool.   Endocrine: Negative.    Genitourinary: Negative.  Negative for hematuria.   Musculoskeletal:  Positive for arthralgias. Negative for back pain, myalgias, neck pain and neck stiffness.   Skin: Negative.  Negative for rash and wound.   Allergic/Immunologic: Negative.  Negative for environmental allergies and food allergies.   Neurological:  Positive for dizziness (vertigo). Negative for syncope, weakness, light-headedness, numbness and headaches.   Hematological:  Bruises/bleeds easily.   Psychiatric/Behavioral: Negative.  Negative for sleep disturbance.        Objective   Vitals:    12/10/24 1318   BP: 130/78   BP Location: Left arm   Patient Position: Sitting   Pulse: 64   SpO2: 95%   Weight: 84.6 kg (186 lb 9.6 oz)   Height: 157.5 cm (62\")      /78 (BP Location: Left arm, Patient Position: Sitting)   Pulse 64   Ht 157.5 cm (62\")   Wt 84.6 kg (186 lb 9.6 oz)   SpO2 95%   BMI 34.13 kg/m²    Lab Results (most recent)       None          Physical Exam  Vitals and nursing note reviewed.   Constitutional:       General: She is not in acute distress.     Appearance: She is well-developed.   HENT:      Head: Normocephalic and atraumatic.   Eyes:      Conjunctiva/sclera: Conjunctivae normal.      Pupils: Pupils are equal, round, and reactive to light.   Neck:      Vascular: No JVD.      Trachea: No tracheal deviation.   Cardiovascular:      " Rate and Rhythm: Normal rate and regular rhythm.      Heart sounds: Normal heart sounds.   Pulmonary:      Effort: Pulmonary effort is normal.      Breath sounds: Normal breath sounds.   Abdominal:      General: Bowel sounds are normal. There is no distension.      Palpations: Abdomen is soft. There is no mass.      Tenderness: There is no abdominal tenderness. There is no guarding or rebound.   Musculoskeletal:         General: No tenderness or deformity. Normal range of motion.      Cervical back: Normal range of motion and neck supple.   Skin:     General: Skin is warm and dry.      Coloration: Skin is not pale.      Findings: No erythema or rash.   Neurological:      Mental Status: She is alert and oriented to person, place, and time.   Psychiatric:         Behavior: Behavior normal.         Thought Content: Thought content normal.         Judgment: Judgment normal.           Procedure     ECG 12 Lead    Date/Time: 12/10/2024 1:21 PM  Performed by: Levi Alberto PA    Authorized by: Levi Alberto PA  Comparison: compared with previous ECG from 12/4/2023  Comparison to previous ECG: Sinus rhythm, rate 65, normal axis, early progression, no acute changes noted.             Assessment & Plan      Diagnosis Plan   1. Shortness of breath        2. Palpitations        3. Essential hypertension            1.  At this time, the patient appears to be doing well.  She reports no angina.  Dyspnea is at baseline.  Palpitations are nonproblematic.    2.  Recent echo monitor findings were largely benign, as above.  As she is clinically stable, I do not feel further evaluation is indicated.    3.  She is normotensive now on current medical regimen.  I will make no adjustments.    4.  For any change in clinical course, the patient would return immediately.  We will continue to see her on routine 6 to 12-month intervals.         Advance Care Planning   ACP discussion was held with the patient during this visit. Patient  does not have an advance directive, declines further assistance.           Electronically signed by: